# Patient Record
Sex: FEMALE | Race: WHITE | URBAN - METROPOLITAN AREA
[De-identification: names, ages, dates, MRNs, and addresses within clinical notes are randomized per-mention and may not be internally consistent; named-entity substitution may affect disease eponyms.]

---

## 2022-06-08 ENCOUNTER — OFFICE VISIT (OUTPATIENT)
Dept: URBAN - METROPOLITAN AREA CLINIC 10 | Facility: CLINIC | Age: 37
End: 2022-06-08
Payer: COMMERCIAL

## 2022-06-08 DIAGNOSIS — H04.123 DRY EYE SYNDROME OF BILATERAL LACRIMAL GLANDS: Primary | ICD-10-CM

## 2022-06-08 DIAGNOSIS — H52.13 MYOPIA, BILATERAL: ICD-10-CM

## 2022-06-08 PROCEDURE — 92004 COMPRE OPH EXAM NEW PT 1/>: CPT | Performed by: OPTOMETRIST

## 2022-06-08 PROCEDURE — 92134 CPTRZ OPH DX IMG PST SGM RTA: CPT | Performed by: OPTOMETRIST

## 2022-06-08 RX ORDER — OXYCODONE HYDROCHLORIDE 5 MG/1
5 MG TABLET ORAL
Qty: 0 | Refills: 0 | Status: ACTIVE
Start: 2022-06-08

## 2022-06-08 RX ORDER — ESCITALOPRAM OXALATE 20 MG/1
20 MG TABLET, FILM COATED ORAL
Qty: 0 | Refills: 0 | Status: ACTIVE
Start: 2022-06-08

## 2022-06-08 RX ORDER — ACETAMINOPHEN 500 MG
500 MG TABLET ORAL
Qty: 0 | Refills: 0 | Status: ACTIVE
Start: 2022-06-08

## 2022-06-08 RX ORDER — LOSARTAN POTASSIUM 25 MG/1
25 MG TABLET, FILM COATED ORAL
Qty: 0 | Refills: 0 | Status: ACTIVE
Start: 2022-06-08

## 2022-06-08 RX ORDER — MORPHINE SULFATE 15 MG/1
15 MG TABLET ORAL
Qty: 0 | Refills: 0 | Status: ACTIVE
Start: 2022-06-08

## 2022-06-08 RX ORDER — LEVOTHYROXINE SODIUM 150 UG/1
TABLET ORAL
Qty: 0 | Refills: 0 | Status: ACTIVE
Start: 2022-06-08

## 2022-06-08 RX ORDER — DM/P-EPHED/ACETAMINOPH/DOXYLAM
LIQUID (ML) ORAL
Qty: 0 | Refills: 0 | Status: ACTIVE
Start: 2022-06-08

## 2022-06-08 ASSESSMENT — VISUAL ACUITY
OS: 20/20
OD: 20/20

## 2022-06-08 ASSESSMENT — INTRAOCULAR PRESSURE
OD: 14
OS: 14

## 2022-06-08 NOTE — IMPRESSION/PLAN
Impression: Dry eye syndrome of bilateral lacrimal glands: H04.123. Plan: Recommend Omega 3s, artificial tears QID or PRN, warm compresses, and lid scrubs. RTC as recommended by oncology. Pt will be starting Tivdak, which can cause ocular toxicity of changes in the corneal epithelium and conjunctiva. Will monitor for vision changes. Main concern is vision loss from corneal ulceration. Will monitor as needed.

## 2022-07-11 ENCOUNTER — OFFICE VISIT (OUTPATIENT)
Dept: URBAN - METROPOLITAN AREA CLINIC 10 | Facility: CLINIC | Age: 37
End: 2022-07-11
Payer: COMMERCIAL

## 2022-07-11 DIAGNOSIS — H04.123 DRY EYE SYNDROME OF BILATERAL LACRIMAL GLANDS: Primary | ICD-10-CM

## 2022-07-11 PROCEDURE — 99213 OFFICE O/P EST LOW 20 MIN: CPT | Performed by: OPTOMETRIST

## 2022-07-11 ASSESSMENT — INTRAOCULAR PRESSURE
OS: 13
OD: 13

## 2022-07-11 NOTE — IMPRESSION/PLAN
Impression: Dry eye syndrome of bilateral lacrimal glands: H04.123. Plan: Recommend Omega 3s, artificial tears QID or PRN, warm compresses, and lid scrubs. RTC as recommended by oncology. Pt is receiving Tivdak infusions, #2 planned this week. Known to cause conjunctival and corneal surface reactions. No surface disease today. RTC 3 weeks for recheck prior to next infusion.

## 2022-08-10 ENCOUNTER — OFFICE VISIT (OUTPATIENT)
Dept: URBAN - METROPOLITAN AREA CLINIC 10 | Facility: CLINIC | Age: 37
End: 2022-08-10
Payer: COMMERCIAL

## 2022-08-10 DIAGNOSIS — H04.123 DRY EYE SYNDROME OF BILATERAL LACRIMAL GLANDS: Primary | ICD-10-CM

## 2022-08-10 PROCEDURE — 99213 OFFICE O/P EST LOW 20 MIN: CPT | Performed by: OPTOMETRIST

## 2022-08-10 ASSESSMENT — INTRAOCULAR PRESSURE
OD: 14
OS: 14

## 2022-08-10 NOTE — IMPRESSION/PLAN
Impression: Dry eye syndrome of bilateral lacrimal glands: H04.123. Plan: Recommend Omega 3s, artificial tears QID or PRN, warm compresses, and lid scrubs. RTC as recommended by oncology. Pt is receiving Tivdak infusions, #2 planned this week. Known to cause conjunctival and corneal surface reactions. Mild PEK OS. Monitor. RTC 3 weeks for recheck prior to next infusion.

## 2022-08-30 ENCOUNTER — OFFICE VISIT (OUTPATIENT)
Dept: URBAN - METROPOLITAN AREA CLINIC 10 | Facility: CLINIC | Age: 37
End: 2022-08-30
Payer: COMMERCIAL

## 2022-08-30 DIAGNOSIS — H04.123 DRY EYE SYNDROME OF BILATERAL LACRIMAL GLANDS: Primary | ICD-10-CM

## 2022-08-30 PROCEDURE — 99213 OFFICE O/P EST LOW 20 MIN: CPT | Performed by: OPTOMETRIST

## 2022-08-30 RX ORDER — FLUOROMETHOLONE 1 MG/ML
0.1 % SUSPENSION/ DROPS OPHTHALMIC
Qty: 2.5 | Refills: 1 | Status: ACTIVE
Start: 2022-08-30

## 2022-08-30 ASSESSMENT — INTRAOCULAR PRESSURE
OS: 11
OD: 10

## 2022-08-30 NOTE — IMPRESSION/PLAN
Impression: Dry eye syndrome of bilateral lacrimal glands: H04.123. Plan: Cont. Omega 3s, artificial tears QID or PRN, warm compresses, and lid scrubs. RTC as recommended by oncology. Pt is receiving Tivdak infusions, #4 planned this week. Known to cause conjunctival and corneal surface reactions. PEK OU, increased. Will add FML bid OU (due to cost, was using Lotemax tid OU 1 day prior to tx). RTC 3 weeks for recheck prior to next infusion. Sooner for any changes.

## 2022-09-14 ENCOUNTER — OFFICE VISIT (OUTPATIENT)
Dept: URBAN - METROPOLITAN AREA CLINIC 10 | Facility: CLINIC | Age: 37
End: 2022-09-14
Payer: COMMERCIAL

## 2022-09-14 DIAGNOSIS — H04.123 DRY EYE SYNDROME OF BILATERAL LACRIMAL GLANDS: Primary | ICD-10-CM

## 2022-09-14 PROCEDURE — 99213 OFFICE O/P EST LOW 20 MIN: CPT | Performed by: OPTOMETRIST

## 2022-09-14 ASSESSMENT — INTRAOCULAR PRESSURE
OS: 9
OD: 10

## 2022-09-14 NOTE — IMPRESSION/PLAN
Impression: Dry eye syndrome of bilateral lacrimal glands: H04.123. Plan: Cont. Omega 3s, artificial tears QID or PRN, warm compresses, and lid scrubs. RTC as recommended by oncology. Pt is receiving Tivdak infusions, #5 planned this week. Known to cause conjunctival and corneal surface reactions. PEK OU, improved. Conj injection worse c pseudomembrane removed today. No hx   or other findings consistent c viral etiology. Cont. FML bid OU (due to cost, was using Lotemax tid OU 1 day prior to tx). RTC 3 weeks for recheck prior to next infusion. Sooner for any changes.

## 2022-11-02 ENCOUNTER — OFFICE VISIT (OUTPATIENT)
Dept: URBAN - METROPOLITAN AREA CLINIC 10 | Facility: CLINIC | Age: 37
End: 2022-11-02
Payer: COMMERCIAL

## 2022-11-02 DIAGNOSIS — H04.123 DRY EYE SYNDROME OF BILATERAL LACRIMAL GLANDS: Primary | ICD-10-CM

## 2022-11-02 PROCEDURE — 99213 OFFICE O/P EST LOW 20 MIN: CPT | Performed by: OPTOMETRIST

## 2022-11-02 ASSESSMENT — INTRAOCULAR PRESSURE
OD: 10
OS: 9

## 2022-11-02 NOTE — IMPRESSION/PLAN
Impression: Dry eye syndrome of bilateral lacrimal glands: H04.123. Plan: Pt. is worse. PEK increased. Stopped FML gtts. Cont. Omega 3s, artificial tears QID or PRN, warm compresses, and lid scrubs. RTC as recommended by oncology. Pt is receiving Tivdak infusions, #5 planned this week. Known to cause conjunctival and corneal surface reactions. Ultra Plug BLL 0.4mm- today Restart FML bid OU (due to cost, was using Lotemax tid OU 1 day prior to tx). Will discuss c Dr. Velasquez May [phone] regarding dosing of Lizzette Quince as pt. is worsening. RTC 1 week for f/u, sooner for NI or new symptoms. ++Recieved call from [facility], NP at Dr. Ferny Medina office. Pt. treatment is going to be held until improvement in ocular findings and possible thyroid findings as well. ++ Cell for [facility]: [phone]

## 2022-11-16 ENCOUNTER — OFFICE VISIT (OUTPATIENT)
Dept: URBAN - METROPOLITAN AREA CLINIC 10 | Facility: CLINIC | Age: 37
End: 2022-11-16
Payer: COMMERCIAL

## 2022-11-16 DIAGNOSIS — H04.123 DRY EYE SYNDROME OF BILATERAL LACRIMAL GLANDS: Primary | ICD-10-CM

## 2022-11-16 PROCEDURE — 99213 OFFICE O/P EST LOW 20 MIN: CPT | Performed by: OPTOMETRIST

## 2022-11-16 RX ORDER — OFLOXACIN 3 MG/ML
0.3 % SOLUTION/ DROPS OPHTHALMIC
Qty: 2.5 | Refills: 1 | Status: ACTIVE
Start: 2022-11-16

## 2022-11-16 NOTE — IMPRESSION/PLAN
Impression: Dry eye syndrome of bilateral lacrimal glands: H04.123. Plan: Pt. is worse. Now epi defect OS. Cont. Omega 3s, artificial tears QID or PRN, warm compresses, and lid scrubs. RTC as recommended by oncology. Pt was receiving Tivdak infusions. Known to cause conjunctival and corneal surface reactions. Ultra Plug BLL 0.4mm Cont. FML qid OD, bid OS. BSCL purevision 8.6 OS today. Add ofloxacin qid OS. Begin ATs pres. free 6x/day. RTC 2 days for f/u. Seen today c Dr. Alexey Montalvo and Dr. Sixto Dean. Discussed c Rogelio King, NP c Dr. Barraza [facility] [phone] regarding dosing of Laura Kiss and tx has been stopped 11/2/22. 
Cell for Rogelio King: [phone]

## 2022-11-17 ENCOUNTER — OFFICE VISIT (OUTPATIENT)
Dept: URBAN - METROPOLITAN AREA CLINIC 10 | Facility: CLINIC | Age: 37
End: 2022-11-17
Payer: COMMERCIAL

## 2022-11-17 PROCEDURE — 99213 OFFICE O/P EST LOW 20 MIN: CPT | Performed by: OPTOMETRIST

## 2022-11-17 NOTE — IMPRESSION/PLAN
Impression: Dry eye syndrome of bilateral lacrimal glands: H04.123. Plan: BSCL fell out last night. Cont. Omega 3s, artificial tears QID or PRN, warm compresses, and lid scrubs. RTC as recommended by oncology. Pt was receiving Tivdak infusions. Known to cause conjunctival and corneal surface reactions. Ultra Plug BLL 0.4mm Cont. FML qid OD, bid OS. BSCL purevision 8.6 OS - fell out same day Kontur 16 BSCL OS today Cont. ofloxacin qid OS. Cont. ATs pres. free 6x/day. Seen yesterday c Dr. Enid Gates and Dr. Christina Newsome. RTC Monday f/u Discussed c Grazyna Oconnell, AJ c Dr. Fahad Lundberg [phone] regarding dosing of Lelia Mitten and tx has been stopped 11/2/22. 
Cell for Grazyna Oconnell: [phone]

## 2022-11-21 ENCOUNTER — OFFICE VISIT (OUTPATIENT)
Dept: URBAN - METROPOLITAN AREA CLINIC 10 | Facility: CLINIC | Age: 37
End: 2022-11-21
Payer: COMMERCIAL

## 2022-11-21 DIAGNOSIS — H04.123 DRY EYE SYNDROME OF BILATERAL LACRIMAL GLANDS: Primary | ICD-10-CM

## 2022-11-21 PROCEDURE — 99213 OFFICE O/P EST LOW 20 MIN: CPT | Performed by: OPTOMETRIST

## 2022-11-21 RX ORDER — OFLOXACIN 3 MG/ML
0.3 % SOLUTION/ DROPS OPHTHALMIC
Qty: 1 | Refills: 1 | Status: ACTIVE
Start: 2022-11-21

## 2022-11-21 NOTE — IMPRESSION/PLAN
Impression: Dry eye syndrome of bilateral lacrimal glands: H04.123. Plan: BCL not in place. Vision slightly improved, epi defect similar, corneal edema is less OS. Small epi defect OD c mild striae. Cont. Omega 3s, artificial tears QID or PRN, warm compresses, and lid scrubs. RTC as recommended by oncology. Pt was receiving Tivdak infusions, known to cause conjunctival and corneal surface reactions. Ultra Plug BLL 0.4mm in place. Cont. FML BID OU. BSCL purevision 8.6 OS fell ou same day, Kontur 16 BSCL fell out twice. NI with BCL. Cont. ofloxacin qid OS. Cont. ATs pres. free 6x or more. Tivdak and tx has been stopped 11/2/22. RTC [address] John Alvarado, sooner PRN.

## 2022-12-01 ENCOUNTER — OFFICE VISIT (OUTPATIENT)
Dept: URBAN - METROPOLITAN AREA CLINIC 10 | Facility: CLINIC | Age: 37
End: 2022-12-01
Payer: COMMERCIAL

## 2022-12-01 DIAGNOSIS — H04.123 DRY EYE SYNDROME OF BILATERAL LACRIMAL GLANDS: ICD-10-CM

## 2022-12-01 DIAGNOSIS — H16.013 BILATERAL CENTRAL CORNEAL ULCERS: ICD-10-CM

## 2022-12-01 DIAGNOSIS — H16.143 PUNCTATE KERATITIS, BILATERAL: Primary | ICD-10-CM

## 2022-12-01 PROCEDURE — 99213 OFFICE O/P EST LOW 20 MIN: CPT | Performed by: OPTOMETRIST

## 2022-12-01 RX ORDER — OFLOXACIN 3 MG/ML
0.3 % SOLUTION/ DROPS OPHTHALMIC
Qty: 1 | Refills: 1 | Status: ACTIVE
Start: 2022-12-01

## 2022-12-01 RX ORDER — FLUOROMETHOLONE 1 MG/ML
0.1 % SUSPENSION/ DROPS OPHTHALMIC
Qty: 2.5 | Refills: 1 | Status: ACTIVE
Start: 2022-12-01

## 2022-12-01 NOTE — IMPRESSION/PLAN
Impression: Punctate keratitis, bilateral: H16.143. Plan: Improving. Epi defects smaller OU. Cont. Omega 3s, artificial tears QID or PRN, warm compresses, and lid scrubs. RTC as recommended by oncology. Pt was receiving Tivdak infusions, known to cause conjunctival and corneal surface reactions. Ultra Plug BLL 0.4mm in place. Cont. FML BID OU. BSCL purevision 8.6 OS fell ou same day, Kontur 16 BSCL fell out twice. NI with BCL. Cont. ofloxacin qid OS. Cont. ATs pres. free 6x or more. Tivdak and tx has been stopped 11/2/22. RTC 1 week, sooner PRN.

## 2022-12-16 ENCOUNTER — OFFICE VISIT (OUTPATIENT)
Dept: URBAN - METROPOLITAN AREA CLINIC 10 | Facility: CLINIC | Age: 37
End: 2022-12-16
Payer: COMMERCIAL

## 2022-12-16 DIAGNOSIS — H16.143 PUNCTATE KERATITIS, BILATERAL: Primary | ICD-10-CM

## 2022-12-16 PROCEDURE — 99213 OFFICE O/P EST LOW 20 MIN: CPT | Performed by: OPTOMETRIST

## 2022-12-16 RX ORDER — LOTEPREDNOL ETABONATE 5 MG/G
0.5 % GEL OPHTHALMIC
Qty: 10 | Refills: 3 | Status: ACTIVE
Start: 2022-12-16

## 2022-12-16 NOTE — IMPRESSION/PLAN
Impression: Punctate keratitis, bilateral: H16.143. Plan: Injection is worse, Epi defect stable OD, smaller OS. Pt reports significant sinus pain. Difficult exam today due to pain, pt affect. Cont. Omega 3s, artificial tears QID or PRN, warm compresses, and lid scrubs. RTC as recommended by oncology. Pt was receiving Tivdak infusions, known to cause conjunctival and corneal surface reactions. Ultra Plug BLL 0.4mm in place. Pt is worsening on FML. Lotemax is medically necessary. Rx sent. BSCL purevision 8.6 OS fell ou same day, Kontur 16 BSCL fell out twice. NI with BCL. Cont. ofloxacin qid OU. Cont. ATs pres. free 6x or more. Tivdak and tx has been stopped 11/2/22. RTC 1 week, sooner PRN.

## 2023-01-05 ENCOUNTER — OFFICE VISIT (OUTPATIENT)
Dept: URBAN - METROPOLITAN AREA CLINIC 10 | Facility: CLINIC | Age: 38
End: 2023-01-05
Payer: COMMERCIAL

## 2023-01-05 DIAGNOSIS — H16.031 CORNEAL ULCER WITH HYPOPYON OF RIGHT EYE: ICD-10-CM

## 2023-01-05 DIAGNOSIS — H16.143 PUNCTATE KERATITIS, BILATERAL: Primary | ICD-10-CM

## 2023-01-05 PROCEDURE — 99213 OFFICE O/P EST LOW 20 MIN: CPT | Performed by: OPTOMETRIST

## 2023-01-05 RX ORDER — BESIFLOXACIN 6 MG/ML
0.6 % SUSPENSION OPHTHALMIC
Qty: 0 | Refills: 0 | Status: INACTIVE
Start: 2023-01-05 | End: 2023-01-11

## 2023-01-05 NOTE — IMPRESSION/PLAN
Impression: Punctate keratitis, bilateral: H16.143.
--BSCL attempted, fell out several times Plan: Ulcer now OD. Improving OS c no epi defect. Pt. ran out of ofloxacin and did not return for f/u. Pt was receiving Tivdak infusions, known to cause conjunctival and corneal surface reactions. Ultra Plug BLL 0.4mm in place. Rx sent for Besivance to begin q1h OD. In the interim Restart ofloxacin q1h OD, tid OS. Stop FML OD. Begin Lotemax OS tid (samples given) Cont. ATs pres. free 6x or more. Tivdak and tx has been stopped 11/2/22. RTC tomorrow.

## 2023-01-06 ENCOUNTER — OFFICE VISIT (OUTPATIENT)
Dept: URBAN - METROPOLITAN AREA CLINIC 10 | Facility: CLINIC | Age: 38
End: 2023-01-06
Payer: COMMERCIAL

## 2023-01-06 PROCEDURE — 99213 OFFICE O/P EST LOW 20 MIN: CPT | Performed by: OPTOMETRIST

## 2023-01-06 NOTE — IMPRESSION/PLAN
Impression: Punctate keratitis, bilateral: H16.143.
--BSCL attempted, fell out several times Plan: OD: ulcer improved, hypopyon gone. OS: new epi defect. Will cont. to hold steroid OD and now hold OS. Pt was receiving Tivdak infusions, known to cause conjunctival and corneal surface reactions. Ultra Plug BLL 0.4mm in place. Cont. ofloxacin q1h OD, tid OS. Stop Lotemax OS Cont. ATs pres. free 6x or more. Tivdak and tx has been stopped 11/2/22. RTC 1 week for f/u.

## 2023-01-06 NOTE — IMPRESSION/PLAN
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 Cultured 1/5/23. Plan: Resolved hypopyon. See above.

## 2023-01-17 ENCOUNTER — OFFICE VISIT (OUTPATIENT)
Dept: URBAN - METROPOLITAN AREA CLINIC 10 | Facility: CLINIC | Age: 38
End: 2023-01-17
Payer: COMMERCIAL

## 2023-01-17 DIAGNOSIS — H16.031 CORNEAL ULCER WITH HYPOPYON OF RIGHT EYE: ICD-10-CM

## 2023-01-17 PROCEDURE — 99213 OFFICE O/P EST LOW 20 MIN: CPT | Performed by: OPTOMETRIST

## 2023-01-17 RX ORDER — DOXYCYCLINE HYCLATE 50 MG/1
50 MG CAPSULE, GELATIN COATED ORAL
Qty: 90 | Refills: 1 | Status: ACTIVE
Start: 2023-01-17

## 2023-01-17 RX ORDER — FLUOROMETHOLONE 1 MG/ML
0.1 % SUSPENSION/ DROPS OPHTHALMIC
Qty: 2.5 | Refills: 1 | Status: ACTIVE
Start: 2023-01-17

## 2023-01-17 NOTE — IMPRESSION/PLAN
Impression: Punctate keratitis, bilateral: H16.143.
--BSCL attempted, fell out several times Plan: OD: ulcer infiltrate and epi defect slightly smaller. OS: new epi defect. Will cont. to hold steroid OD and now hold OS. Pt was receiving Tivdak infusions, known to cause conjunctival and corneal surface reactions. Ultra Plug BLL 0.4mm in place. Cont. ofloxacin q1h OD, tid OS. Restart FML bid OU. Also add doxycycline 50mg po bid. Pt. will clear c oncology as well. Cont. ATs pres. free 6x or more. Tivdak and tx has been stopped 11/2/22. RTC [address] Romy Mendes and next week c Dr. Mario Alberto Atkins.

## 2023-01-17 NOTE — IMPRESSION/PLAN
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 Cultured 1/5/23. Plan: Culture reviewed few positive cocci and scan skin michelle growth but otherwise negative. See above.

## 2023-01-19 ENCOUNTER — OFFICE VISIT (OUTPATIENT)
Dept: URBAN - METROPOLITAN AREA CLINIC 10 | Facility: CLINIC | Age: 38
End: 2023-01-19
Payer: COMMERCIAL

## 2023-01-19 DIAGNOSIS — H16.143 PUNCTATE KERATITIS, BILATERAL: Primary | ICD-10-CM

## 2023-01-19 PROCEDURE — 99213 OFFICE O/P EST LOW 20 MIN: CPT | Performed by: STUDENT IN AN ORGANIZED HEALTH CARE EDUCATION/TRAINING PROGRAM

## 2023-01-19 NOTE — IMPRESSION/PLAN
Impression: Punctate keratitis, bilateral: H16.143.
--BSCL attempted, fell out several times Plan: OD: ulcer infiltrate and epi defect slightly smaller today (mildly improving) OS: no epi defect today Pt was receiving Tivdak infusions, known to cause conjunctival and corneal surface reactions. Ultra Plug BLL 0.4mm in place. Cont. ofloxacin q1h OD, tid OS. Cont. FML bid OU. Cont. doxycycline 50mg po bid. Pt. will clear c oncology as well. Cont. ATs pres. free 6x or more. Tivdak and tx has been stopped 11/2/22. 
RTC next week with Dr. Laverne Nash

## 2023-01-30 ENCOUNTER — OFFICE VISIT (OUTPATIENT)
Dept: URBAN - METROPOLITAN AREA CLINIC 10 | Facility: CLINIC | Age: 38
End: 2023-01-30
Payer: COMMERCIAL

## 2023-01-30 DIAGNOSIS — H16.143 PUNCTATE KERATITIS, BILATERAL: Primary | ICD-10-CM

## 2023-01-30 PROCEDURE — 99213 OFFICE O/P EST LOW 20 MIN: CPT | Performed by: OPTOMETRIST

## 2023-01-30 RX ORDER — OFLOXACIN 3 MG/ML
0.3 % SOLUTION/ DROPS OPHTHALMIC
Qty: 10 | Refills: 1 | Status: ACTIVE
Start: 2023-01-30

## 2023-01-30 NOTE — IMPRESSION/PLAN
Impression: Punctate keratitis, bilateral: H16.143.
--BSCL attempted, fell out several times Plan: Significantly improved OD: ulcer infiltrate and epi defect slightly smaller today (mildly improving) OS: no epi defect today Pt was receiving Tivdak infusions, known to cause conjunctival and corneal surface reactions. Ultra Plug BLL 0.4mm in place. Cont. ofloxacin q1h OD, tid OS. Cont. FML bid OU. Cont. doxycycline 50mg po bid. Pt. will clear c oncology as well. Cont. ATs pres. free 6x or more. Tivdak and tx has been stopped 11/2/22. 
RTC as scheduled with Dr. Mia Mercedes

## 2023-02-13 ENCOUNTER — OFFICE VISIT (OUTPATIENT)
Dept: URBAN - METROPOLITAN AREA CLINIC 10 | Facility: CLINIC | Age: 38
End: 2023-02-13
Payer: COMMERCIAL

## 2023-02-13 DIAGNOSIS — H16.143 PUNCTATE KERATITIS, BILATERAL: Primary | ICD-10-CM

## 2023-02-13 DIAGNOSIS — H16.031 CORNEAL ULCER WITH HYPOPYON OF RIGHT EYE: ICD-10-CM

## 2023-02-13 PROCEDURE — 99213 OFFICE O/P EST LOW 20 MIN: CPT | Performed by: OPHTHALMOLOGY

## 2023-02-13 RX ORDER — FLUOROMETHOLONE 1 MG/ML
0.1 % SUSPENSION/ DROPS OPHTHALMIC
Qty: 2.5 | Refills: 1 | Status: ACTIVE
Start: 2023-02-13

## 2023-02-13 RX ORDER — ERYTHROMYCIN 5 MG/G
OINTMENT OPHTHALMIC
Qty: 3.5 | Refills: 6 | Status: ACTIVE
Start: 2023-02-13

## 2023-02-13 RX ORDER — OFLOXACIN 3 MG/ML
0.3 % SOLUTION/ DROPS OPHTHALMIC
Qty: 2.5 | Refills: 1 | Status: ACTIVE
Start: 2023-02-13

## 2023-02-13 RX ORDER — DOXYCYCLINE HYCLATE 50 MG/1
50 MG CAPSULE, GELATIN COATED ORAL
Qty: 90 | Refills: 1 | Status: ACTIVE
Start: 2023-02-13

## 2023-02-13 ASSESSMENT — INTRAOCULAR PRESSURE
OS: 14
OD: 12

## 2023-02-13 NOTE — IMPRESSION/PLAN
Impression: Punctate keratitis, bilateral: H16.143.
- 2/2 Tivdak cancer tx; has been stopped 11/2/22.
- Ultra Plug BLL 0.4mm in place. Plan: Cont. ofloxacin q1h OD, tid OS.
start erythro smitha qhs
Cont. FML bid OS. Cont. doxycycline 50mg po qd Cont. ATs pres. free 6x or more.

## 2023-02-13 NOTE — IMPRESSION/PLAN
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 Cultured 1/5/23. Plan: Appears to be improving. Cont oflox q1hr w/a. Stop [address] [facility] OD for now. If no improvement, will start fortified abx. Pt's caretaker will call to schedule appt; currently with terminal cancer and having difficulty attending appt.